# Patient Record
Sex: FEMALE | Race: WHITE | NOT HISPANIC OR LATINO | Employment: FULL TIME | URBAN - METROPOLITAN AREA
[De-identification: names, ages, dates, MRNs, and addresses within clinical notes are randomized per-mention and may not be internally consistent; named-entity substitution may affect disease eponyms.]

---

## 2017-12-26 ENCOUNTER — APPOINTMENT (OUTPATIENT)
Dept: WOMENS IMAGING | Facility: HOSPITAL | Age: 52
End: 2017-12-26

## 2017-12-26 PROCEDURE — 77067 SCR MAMMO BI INCL CAD: CPT | Performed by: RADIOLOGY

## 2017-12-26 PROCEDURE — 77063 BREAST TOMOSYNTHESIS BI: CPT | Performed by: RADIOLOGY

## 2017-12-28 RX ORDER — PAROXETINE 10 MG/1
10 TABLET, FILM COATED ORAL EVERY MORNING
COMMUNITY
End: 2019-06-23

## 2018-01-26 ENCOUNTER — OFFICE VISIT (OUTPATIENT)
Dept: SURGERY | Facility: CLINIC | Age: 53
End: 2018-01-26

## 2018-01-26 VITALS
BODY MASS INDEX: 27.13 KG/M2 | OXYGEN SATURATION: 98 % | WEIGHT: 158.9 LBS | HEART RATE: 79 BPM | DIASTOLIC BLOOD PRESSURE: 98 MMHG | SYSTOLIC BLOOD PRESSURE: 140 MMHG | HEIGHT: 64 IN | TEMPERATURE: 98.5 F

## 2018-01-26 DIAGNOSIS — K64.8 INTERNAL HEMORRHOIDS WITH COMPLICATION: Primary | ICD-10-CM

## 2018-01-26 DIAGNOSIS — K64.4 ANAL SKIN TAG: ICD-10-CM

## 2018-01-26 DIAGNOSIS — Z12.11 SCREENING FOR COLON CANCER: ICD-10-CM

## 2018-01-26 PROCEDURE — 99244 OFF/OP CNSLTJ NEW/EST MOD 40: CPT | Performed by: COLON & RECTAL SURGERY

## 2018-01-26 PROCEDURE — 46600 DIAGNOSTIC ANOSCOPY SPX: CPT | Performed by: COLON & RECTAL SURGERY

## 2018-01-26 RX ORDER — VALACYCLOVIR HYDROCHLORIDE 500 MG/1
TABLET, FILM COATED ORAL
Refills: 3 | COMMUNITY
Start: 2017-11-26 | End: 2018-02-28

## 2018-01-26 RX ORDER — ZOLPIDEM TARTRATE 10 MG/1
TABLET ORAL
Refills: 3 | COMMUNITY
Start: 2018-01-12 | End: 2018-02-28

## 2018-01-26 RX ORDER — SODIUM CHLORIDE, SODIUM LACTATE, POTASSIUM CHLORIDE, CALCIUM CHLORIDE 600; 310; 30; 20 MG/100ML; MG/100ML; MG/100ML; MG/100ML
30 INJECTION, SOLUTION INTRAVENOUS CONTINUOUS
Status: CANCELLED | OUTPATIENT
Start: 2018-03-01

## 2018-01-26 RX ORDER — NAPROXEN 500 MG/1
TABLET ORAL
Refills: 0 | COMMUNITY
Start: 2018-01-18 | End: 2018-02-28

## 2018-01-26 NOTE — PROGRESS NOTES
Leonela Blanco is a 52 y.o. female who is seen as a consult at the request of Kimberli Guardado MD for extra anal tissue    HPI:    Pt c/o problems with hemorrhoids since having kids    Flares up when she runs or does Jazzercise  She recently upped her weights in Jazzercise  Started taking naproxen last Monday    She feels extra tissue at anus  Itching, burning with BMs    She has a hemorrhoid flare about 6 times per year    She only sees a small amount of blood if she has a hard stool go by    She usually has 1 BM per day    Tried fiber pills, which help with BMs  Only takes as needed    Stools usually formed, sometimes soft    She uses wet wipes    She has used anusol cream 2 times, which has been helpful    She walks 4 miles per day    She has had LE DVT s/p hysterectomy  : was on Xarelto for 3 months, then d/c'ed    2 vaginal deliveries, 1 c-s  No episiotomy or tearing   8 lb and 9 lb with vaginal deliveries    She has never had a colonoscopy    FamHx: no known hx colon polyps or colon cancer    Past Medical History:   Diagnosis Date   • Anemia    • Anxiety    • Depression    • DVT (deep venous thrombosis)     PELVIC VEIN, AFTER HYSTERECTOMY   • Hemorrhoids    • Hot flashes    • HSV infection    • Hyperlipidemia    • Irritability    • Oral yeast infection    • Urinary incontinence     RESOLVED SINCE TVT   • Vaginitis    • Vulvovaginitis        Past Surgical History:   Procedure Laterality Date   •  SECTION N/A 2001   • ENDOMETRIAL ABLATION N/A 2009    WITH HYSTEROSCOPY AND D&C, DR. JAKOB RUTHERFORD AT [facility]   • HYSTERECTOMY N/A 2014    [facility] WITH BSO AND TVT, DR. KIMBERLI GUARDADO AT [facility]   • VEIN SURGERY Bilateral     FOR VENOUS INSUFFICIENCY, ENDOVENOUS LASER ABLATION OF THE GREATER SAPHENOUS VEINS       Social History:   reports that she has never smoked. She has never used smokeless tobacco. She reports that she drinks alcohol. She reports that she does not use illicit drugs.      Marriage  status:     Family History   Problem Relation Age of Onset   • No Known Problems Daughter    • No Known Problems Son    • Throat cancer Maternal Grandmother    • Alcohol abuse Maternal Grandmother    • Throat cancer Maternal Grandfather    • No Known Problems Son    • COPD Mother    • No Known Problems Father    • No Known Problems Maternal Aunt    • No Known Problems Maternal Uncle    • No Known Problems Paternal Grandmother    • No Known Problems Paternal Grandfather          Current Outpatient Prescriptions:   •  BOSWELLIA BERNARDO PO, Take  by mouth., Disp: , Rfl:   •  naproxen (NAPROSYN) 500 MG tablet, TAKE 1 TABLET BY ORAL ROUTE 2 TIMES EVERY DAY WITH FOOD, Disp: , Rfl: 0  •  PARoxetine (PAXIL) 10 MG tablet, Take 10 mg by mouth Every Morning., Disp: , Rfl:   •  PROCTOSOL HC 2.5 % rectal cream, USE 1 (ONE) APPLICATION TWO TO FOUR TIMES DAILY AFTER BOWEL MOVEMENTS, Disp: , Rfl: 0  •  valACYclovir (VALTREX) 500 MG tablet, TAKE 1 TABLET BY MOUTH 3 TIMES A DAY, Disp: , Rfl: 3  •  zolpidem (AMBIEN) 10 MG tablet, TAKE 1 TABLET BY MOUTH ONCE DAILY AT BEDTIME, Disp: , Rfl: 3    Allergy  Venofer [iron sucrose] and Sulfa antibiotics    Review of Systems   Constitution: Positive for weight gain. Negative for decreased appetite and weakness.   HENT: Negative for congestion, hearing loss and hoarse voice.    Eyes: Negative for blurred vision, discharge and visual disturbance.   Cardiovascular: Negative for chest pain, cyanosis and leg swelling.   Respiratory: Negative for cough, shortness of breath, sleep disturbances due to breathing and snoring.    Endocrine: Negative for cold intolerance and heat intolerance.   Hematologic/Lymphatic: Does not bruise/bleed easily.   Skin: Negative for itching, poor wound healing and skin cancer.   Musculoskeletal: Positive for back pain. Negative for arthritis, joint pain and joint swelling.   Gastrointestinal: Negative for abdominal pain, change in bowel habit, bowel incontinence  and constipation.   Genitourinary: Negative for bladder incontinence, dysuria and hematuria.   Neurological: Negative for brief paralysis, excessive daytime sleepiness, dizziness, focal weakness, headaches and light-headedness.   Psychiatric/Behavioral: Negative for altered mental status and hallucinations. The patient does not have insomnia.    Allergic/Immunologic: Negative for HIV exposure and persistent infections.   All other systems reviewed and are negative.      Vitals:    01/26/18 1239   BP: 140/98   Pulse: 79   Temp: 98.5 °F (36.9 °C)   SpO2: 98%     Body mass index is 27.28 kg/(m^2).    Physical Exam   Constitutional: She is oriented to person, place, and time. She appears well-developed and well-nourished. No distress.   HENT:   Head: Normocephalic and atraumatic.   Nose: Nose normal.   Mouth/Throat: Oropharynx is clear and moist.   Eyes: Conjunctivae and EOM are normal. Pupils are equal, round, and reactive to light.   Neck: Normal range of motion. No tracheal deviation present.   Pulmonary/Chest: Effort normal and breath sounds normal. No respiratory distress.   Abdominal: Soft. Bowel sounds are normal. She exhibits no distension.   Genitourinary:   Genitourinary Comments: Perianal exam: external hem - wnl.  Minor anterior tag  SHENG- good tone, no masses  Anoscopy performed:  Grade 2 x 1 internal hem RA.  Grade 1 x 2: RP & LL   Musculoskeletal: Normal range of motion. She exhibits no edema or deformity.   Neurological: She is alert and oriented to person, place, and time. No cranial nerve deficit. Coordination and gait normal.   Skin: Skin is warm and dry.   Psychiatric: She has a normal mood and affect. Her behavior is normal. Judgment normal.       Review of Medical Record:  I reviewed Dr. Guardado records: pt c/o hemorrhoids.  Rx anusol    Assessment:  1. Internal hemorrhoids with complication    2. Anal skin tag        Plan:      For the hemorrhoids, they're nonsurgical at this point.  I recommended  for patient to treat conservatively with MiraLAX, fiber, and the hemorrhoid cream from Dr. Guardado bid x5-7 days prn hemorrhoid flare.  She can use barrier cream such as Desitin for perianal irritation when exercising.    Gave samples Vasculera and instructions for use.      As she is 52 years old as has never had a colonoscopy, I recommend doing a screening colonoscopy.  I described the patient risks benefits and alternatives and she wishes to proceed.        Scribed for Audra Preston MD by Stella Gross PA-C 1/26/2018  This patient was evaluated by me, recommendations made, documentation reviewed, edited, and revised by me, Audra Preston MD

## 2018-02-28 RX ORDER — VALACYCLOVIR HYDROCHLORIDE 500 MG/1
500 TABLET, FILM COATED ORAL AS NEEDED
COMMUNITY
End: 2019-06-23

## 2018-03-01 ENCOUNTER — ANESTHESIA (OUTPATIENT)
Dept: GASTROENTEROLOGY | Facility: HOSPITAL | Age: 53
End: 2018-03-01

## 2018-03-01 ENCOUNTER — ANESTHESIA EVENT (OUTPATIENT)
Dept: GASTROENTEROLOGY | Facility: HOSPITAL | Age: 53
End: 2018-03-01

## 2018-03-01 ENCOUNTER — HOSPITAL ENCOUNTER (OUTPATIENT)
Facility: HOSPITAL | Age: 53
Setting detail: HOSPITAL OUTPATIENT SURGERY
Discharge: HOME | End: 2018-03-01
Attending: COLON & RECTAL SURGERY | Admitting: COLON & RECTAL SURGERY

## 2018-03-01 VITALS
SYSTOLIC BLOOD PRESSURE: 137 MMHG | WEIGHT: 151.44 LBS | HEIGHT: 64 IN | HEART RATE: 62 BPM | TEMPERATURE: 97.2 F | DIASTOLIC BLOOD PRESSURE: 83 MMHG | RESPIRATION RATE: 16 BRPM | OXYGEN SATURATION: 96 % | BODY MASS INDEX: 25.85 KG/M2

## 2018-03-01 DIAGNOSIS — Z12.11 SCREENING FOR COLON CANCER: ICD-10-CM

## 2018-03-01 PROCEDURE — 45378 DIAGNOSTIC COLONOSCOPY: CPT | Performed by: COLON & RECTAL SURGERY

## 2018-03-01 PROCEDURE — 25010000002 PROPOFOL 10 MG/ML EMULSION: Performed by: NURSE ANESTHETIST, CERTIFIED REGISTERED

## 2018-03-01 RX ORDER — PROPOFOL 10 MG/ML
VIAL (ML) INTRAVENOUS AS NEEDED
Status: DISCONTINUED | OUTPATIENT
Start: 2018-03-01 | End: 2018-03-01 | Stop reason: SURG

## 2018-03-01 RX ORDER — SODIUM CHLORIDE, SODIUM LACTATE, POTASSIUM CHLORIDE, CALCIUM CHLORIDE 600; 310; 30; 20 MG/100ML; MG/100ML; MG/100ML; MG/100ML
30 INJECTION, SOLUTION INTRAVENOUS CONTINUOUS
Status: DISCONTINUED | OUTPATIENT
Start: 2018-03-01 | End: 2018-03-01 | Stop reason: HOSPADM

## 2018-03-01 RX ORDER — PROPOFOL 10 MG/ML
VIAL (ML) INTRAVENOUS CONTINUOUS PRN
Status: DISCONTINUED | OUTPATIENT
Start: 2018-03-01 | End: 2018-03-01 | Stop reason: SURG

## 2018-03-01 RX ORDER — SODIUM CHLORIDE 0.9 % (FLUSH) 0.9 %
3 SYRINGE (ML) INJECTION AS NEEDED
Status: DISCONTINUED | OUTPATIENT
Start: 2018-03-01 | End: 2018-03-01 | Stop reason: HOSPADM

## 2018-03-01 RX ORDER — LIDOCAINE HYDROCHLORIDE 20 MG/ML
INJECTION, SOLUTION INFILTRATION; PERINEURAL AS NEEDED
Status: DISCONTINUED | OUTPATIENT
Start: 2018-03-01 | End: 2018-03-01 | Stop reason: SURG

## 2018-03-01 RX ADMIN — LIDOCAINE HYDROCHLORIDE 60 MG: 20 INJECTION, SOLUTION INFILTRATION; PERINEURAL at 14:09

## 2018-03-01 RX ADMIN — PROPOFOL 180 MCG/KG/MIN: 10 INJECTION, EMULSION INTRAVENOUS at 14:10

## 2018-03-01 RX ADMIN — PROPOFOL 70 MG: 10 INJECTION, EMULSION INTRAVENOUS at 14:09

## 2018-03-01 RX ADMIN — SODIUM CHLORIDE, POTASSIUM CHLORIDE, SODIUM LACTATE AND CALCIUM CHLORIDE 30 ML/HR: 600; 310; 30; 20 INJECTION, SOLUTION INTRAVENOUS at 13:42

## 2018-03-01 NOTE — H&P
Leonela Blanco is a 52 y.o. female  who is referred by Audra Preston MD for a colonoscopy. She is an  has a history of screening for colon cancer.     She denies any change in bowel function, melena, or hematochezia.    Past Medical History:   Diagnosis Date   • Anemia    • Depression    • DVT (deep venous thrombosis)     PELVIC VEIN, AFTER HYSTERECTOMY   • Hemorrhoids    • Hot flashes    • HSV infection    • Hyperlipidemia    • Irritability    • Vaginitis    • Vulvovaginitis        Past Surgical History:   Procedure Laterality Date   •  SECTION N/A 2001   • ENDOMETRIAL ABLATION N/A 2009    WITH HYSTEROSCOPY AND D&C, DR. JAKOB RUTHERFORD AT [facility]   • HYSTERECTOMY N/A 2014    [facility] WITH BSO AND TVT, DR. KIMBERLI YU AT [facility]   • VEIN SURGERY Bilateral     FOR VENOUS INSUFFICIENCY, ENDOVENOUS LASER ABLATION OF THE GREATER SAPHENOUS VEINS       Prescriptions Prior to Admission   Medication Sig Dispense Refill Last Dose   • hydrocortisone (ANUSOL-HC) 2.5 % rectal cream Insert 1 application into the rectum As Needed for Hemorrhoids.   Past Month at Unknown time   • PARoxetine (PAXIL) 10 MG tablet Take 10 mg by mouth Every Morning.   2018 at Unknown time   • BOSWELLIA BERNARDO PO Take 1 tablet by mouth As Needed.   More than a month at Unknown time   • valACYclovir (VALTREX) 500 MG tablet Take 500 mg by mouth As Needed.   More than a month at Unknown time       Allergies   Allergen Reactions   • Venofer [Iron Sucrose] Anaphylaxis   • Sulfa Antibiotics Rash       Family History   Problem Relation Age of Onset   • No Known Problems Daughter    • No Known Problems Son    • Throat cancer Maternal Grandmother    • Alcohol abuse Maternal Grandmother    • Throat cancer Maternal Grandfather    • No Known Problems Son    • COPD Mother    • No Known Problems Father    • No Known Problems Maternal Aunt    • No Known Problems Maternal Uncle    • No Known Problems Paternal Grandmother    • No Known Problems Paternal  Grandfather    • Malig Hyperthermia Neg Hx        Social History     Social History   • Marital status:      Spouse name: N/A   • Number of children: N/A   • Years of education: N/A     Occupational History   • Not on file.     Social History Main Topics   • Smoking status: Never Smoker   • Smokeless tobacco: Never Used   • Alcohol use Yes      Comment: OCCASIONAL   • Drug use: No   • Sexual activity: Yes     Partners: Male     Birth control/ protection: Surgical     Other Topics Concern   • Not on file     Social History Narrative       Review of Systems   Gastrointestinal: Negative for abdominal pain, nausea and vomiting.   All other systems reviewed and are negative.      Vitals:    03/01/18 1318   BP: 129/82   Pulse: 64   Resp: 16   Temp: 98.6 °F (37 °C)   SpO2: 98%         Physical Exam   Constitutional: She is oriented to person, place, and time. She appears well-developed and well-nourished.   HENT:   Head: Normocephalic and atraumatic.   Eyes: EOM are normal. Pupils are equal, round, and reactive to light.   Cardiovascular: Regular rhythm.    Pulmonary/Chest: Effort normal.   Abdominal: Soft. She exhibits no distension.   Musculoskeletal: Normal range of motion.   Neurological: She is alert and oriented to person, place, and time.   Skin: Skin is warm and dry.   Psychiatric: Judgment and thought content normal.         Assessment/Plan      screening for colon cancer.         I recommend colonoscopy.  I described risks, benefits of the procedure with the patient including but not limited to bleeding, infection, possibility of perforation and possible polypectomy. All of the patient's questions were answered and they would like to proceed with the above recommendations.

## 2018-03-01 NOTE — PLAN OF CARE
Problem: Patient Care Overview (Adult)  Goal: Plan of Care Review  Outcome: Ongoing (interventions implemented as appropriate)   03/01/18 1305   Coping/Psychosocial Response Interventions   Plan Of Care Reviewed With patient   Patient Care Overview   Progress progress toward functional goals as expected     Goal: Adult Individualization and Mutuality  Outcome: Ongoing (interventions implemented as appropriate)    Goal: Discharge Needs Assessment  Outcome: Ongoing (interventions implemented as appropriate)   03/01/18 1305   Discharge Needs Assessment   Concerns To Be Addressed no discharge needs identified   Discharge Disposition home or self-care   Living Environment   Transportation Available car;family or friend will provide       Problem: GI Endoscopy (Adult)  Intervention: Monitor/Manage Procedure Recovery   03/01/18 1305   Respiratory Interventions   Airway/Ventilation Management airway patency maintained   Coping/Psychosocial Interventions   Environmental Support calm environment promoted   Activity   Activity Type activity adjusted per tolerance     Intervention: Prevent Myra-procedural Injury   03/01/18 1305   Positioning   Positioning side lying, left       Goal: Signs and Symptoms of Listed Potential Problems Will be Absent or Manageable (GI Endoscopy)  Outcome: Ongoing (interventions implemented as appropriate)   03/01/18 1305   GI Endoscopy   Problems Assessed (GI Endoscopy) all   Problems Present (GI Endoscopy) none

## 2018-03-01 NOTE — ANESTHESIA POSTPROCEDURE EVALUATION
"Patient: Leonela Blanco    Procedure Summary     Date Anesthesia Start Anesthesia Stop Room / Location    03/01/18 5073 [id number]  [facility] /  [facility]       Procedure Diagnosis Surgeon Provider    COLONOSCOPY TO [facility] (N/A ) Screening for colon cancer  (Screening for colon cancer [Z12.11]) MD Amparo Castorena MD          Anesthesia Type: MAC  Last vitals  BP   116/66 (03/01/18 1439)   Temp   36.2 °C (97.2 °F) (03/01/18 1439)   Pulse   60 (03/01/18 1439)   Resp   16 (03/01/18 1439)     SpO2   100 % (03/01/18 1439)     Post Anesthesia Care and Evaluation    Patient location during evaluation: PACU  Patient participation: complete - patient participated  Level of consciousness: awake  Pain score: 0  Pain management: adequate  Airway patency: patent  Anesthetic complications: No anesthetic complications    Cardiovascular status: acceptable  Respiratory status: acceptable  Hydration status: acceptable    Comments: Blood pressure 116/66, pulse 60, temperature 36.2 °C (97.2 °F), temperature source Oral, resp. rate 16, height 162.6 cm (64\"), weight 68.7 kg (151 lb 7 oz), SpO2 100 %, not currently breastfeeding.    No anesthesia care post op    "

## 2018-03-01 NOTE — ANESTHESIA PREPROCEDURE EVALUATION
Anesthesia Evaluation     Patient summary reviewed and Nursing notes reviewed   NPO Solid Status: > 8 hours  NPO Liquid Status: > 6 hours           Airway   Mallampati: I  TM distance: <3 FB  Neck ROM: full  Possible difficult intubation  Dental - normal exam     Pulmonary - normal exam   Cardiovascular - normal exam    (+) DVT,       Neuro/Psych  (+) psychiatric history,     GI/Hepatic/Renal/Endo      Musculoskeletal     Abdominal  - normal exam    Bowel sounds: normal.   Substance History      OB/GYN          Other                        Anesthesia Plan    ASA 2     MAC     Anesthetic plan and risks discussed with patient.

## 2018-04-10 ENCOUNTER — TRANSCRIBE ORDERS (OUTPATIENT)
Dept: ADMINISTRATIVE | Facility: HOSPITAL | Age: 53
End: 2018-04-10

## 2018-04-10 DIAGNOSIS — G89.29 OTHER CHRONIC BACK PAIN: Primary | ICD-10-CM

## 2018-04-10 DIAGNOSIS — M54.9 OTHER CHRONIC BACK PAIN: Primary | ICD-10-CM

## 2018-04-17 ENCOUNTER — APPOINTMENT (OUTPATIENT)
Dept: MRI IMAGING | Facility: HOSPITAL | Age: 53
End: 2018-04-17

## 2020-01-21 PROBLEM — 70153002 HEMORRHOIDS: Status: ACTIVE | Noted: 2020-01-21

## 2020-12-29 ENCOUNTER — OFFICE VISIT (OUTPATIENT)
Dept: URBAN - METROPOLITAN AREA CLINIC 27 | Facility: CLINIC | Age: 55
End: 2020-12-29

## 2021-01-29 ENCOUNTER — OFFICE VISIT (OUTPATIENT)
Dept: URBAN - METROPOLITAN AREA CLINIC 29 | Facility: CLINIC | Age: 56
End: 2021-01-29

## 2021-03-30 ENCOUNTER — BULK ORDERING (OUTPATIENT)
Dept: CASE MANAGEMENT | Facility: OTHER | Age: 56
End: 2021-03-30

## 2021-03-30 DIAGNOSIS — Z23 IMMUNIZATION DUE: ICD-10-CM

## 2021-08-30 ENCOUNTER — OFFICE VISIT (OUTPATIENT)
Dept: URBAN - METROPOLITAN AREA CLINIC 29 | Facility: CLINIC | Age: 56
End: 2021-08-30

## 2021-08-30 PROBLEM — 238131007 OVERWEIGHT: Status: ACTIVE | Noted: 2021-08-30

## 2021-08-30 PROBLEM — 31704005 RESIDUAL HEMORRHOIDAL SKIN TAGS: Status: ACTIVE | Noted: 2021-08-30

## 2022-04-30 ENCOUNTER — TELEPHONE ENCOUNTER (OUTPATIENT)
Dept: URBAN - METROPOLITAN AREA CLINIC 121 | Facility: CLINIC | Age: 57
End: 2022-04-30

## 2022-05-01 ENCOUNTER — TELEPHONE ENCOUNTER (OUTPATIENT)
Dept: URBAN - METROPOLITAN AREA CLINIC 121 | Facility: CLINIC | Age: 57
End: 2022-05-01

## (undated) DEVICE — CANN NASL CO2 TRULINK W/O2 A/

## (undated) DEVICE — THE TORRENT IRRIGATION SCOPE CONNECTOR IS USED WITH THE TORRENT IRRIGATION TUBING TO PROVIDE IRRIGATION FLUIDS SUCH AS STERILE WATER DURING GASTROINTESTINAL ENDOSCOPIC PROCEDURES WHEN USED IN CONJUNCTION WITH AN IRRIGATION PUMP (OR ELECTROSURGICAL UNIT).: Brand: TORRENT

## (undated) DEVICE — TUBING, SUCTION, 1/4" X 10', STRAIGHT: Brand: MEDLINE

## (undated) DEVICE — Device: Brand: DEFENDO AIR/WATER/SUCTION AND BIOPSY VALVE